# Patient Record
Sex: MALE | Race: WHITE | NOT HISPANIC OR LATINO | Employment: OTHER | URBAN - NONMETROPOLITAN AREA
[De-identification: names, ages, dates, MRNs, and addresses within clinical notes are randomized per-mention and may not be internally consistent; named-entity substitution may affect disease eponyms.]

---

## 2024-11-08 ENCOUNTER — OFFICE VISIT (OUTPATIENT)
Dept: CARDIOLOGY | Facility: CLINIC | Age: 84
End: 2024-11-08
Payer: MEDICARE

## 2024-11-08 VITALS
HEART RATE: 79 BPM | BODY MASS INDEX: 34.5 KG/M2 | SYSTOLIC BLOOD PRESSURE: 102 MMHG | DIASTOLIC BLOOD PRESSURE: 64 MMHG | WEIGHT: 241 LBS | HEIGHT: 70 IN

## 2024-11-08 DIAGNOSIS — I48.20 CHRONIC ATRIAL FIBRILLATION (MULTI): ICD-10-CM

## 2024-11-08 DIAGNOSIS — I25.119 ATHEROSCLEROSIS OF NATIVE CORONARY ARTERY OF NATIVE HEART WITH ANGINA PECTORIS: ICD-10-CM

## 2024-11-08 DIAGNOSIS — I25.10 MULTIPLE VESSEL CORONARY ARTERY DISEASE: ICD-10-CM

## 2024-11-08 DIAGNOSIS — Z78.9 NURSING HOME RESIDENT: ICD-10-CM

## 2024-11-08 DIAGNOSIS — I73.9 PVD (PERIPHERAL VASCULAR DISEASE) (CMS-HCC): ICD-10-CM

## 2024-11-08 DIAGNOSIS — Z79.899 HIGH RISK MEDICATION USE: ICD-10-CM

## 2024-11-08 DIAGNOSIS — R06.00 DYSPNEA, UNSPECIFIED TYPE: Primary | ICD-10-CM

## 2024-11-08 DIAGNOSIS — Z95.1 S/P CABG X 3: ICD-10-CM

## 2024-11-08 DIAGNOSIS — E78.2 MIXED HYPERLIPIDEMIA: ICD-10-CM

## 2024-11-08 DIAGNOSIS — G47.33 OBSTRUCTIVE SLEEP APNEA SYNDROME: ICD-10-CM

## 2024-11-08 DIAGNOSIS — Z87.891 FORMER SMOKER: ICD-10-CM

## 2024-11-08 PROBLEM — E78.5 HYPERLIPIDEMIA: Status: ACTIVE | Noted: 2024-11-08

## 2024-11-08 PROBLEM — E11.65 TYPE 2 DIABETES MELLITUS WITH HYPERGLYCEMIA (MULTI): Status: ACTIVE | Noted: 2024-11-08

## 2024-11-08 PROBLEM — I44.7 LBBB (LEFT BUNDLE BRANCH BLOCK): Status: ACTIVE | Noted: 2024-11-08

## 2024-11-08 PROBLEM — I25.810 ARTERIOSCLEROSIS OF CORONARY ARTERY BYPASS GRAFT: Status: ACTIVE | Noted: 2024-11-08

## 2024-11-08 PROBLEM — I48.19 PERSISTENT ATRIAL FIBRILLATION (MULTI): Status: RESOLVED | Noted: 2024-11-08 | Resolved: 2024-11-08

## 2024-11-08 PROBLEM — G47.30 SLEEP APNEA: Status: ACTIVE | Noted: 2024-11-08

## 2024-11-08 PROBLEM — I48.19 PERSISTENT ATRIAL FIBRILLATION (MULTI): Status: ACTIVE | Noted: 2024-11-08

## 2024-11-08 PROBLEM — I25.810 ARTERIOSCLEROSIS OF CORONARY ARTERY BYPASS GRAFT: Status: RESOLVED | Noted: 2024-11-08 | Resolved: 2024-11-08

## 2024-11-08 PROBLEM — I47.20 VENTRICULAR TACHYCARDIA (MULTI): Status: ACTIVE | Noted: 2024-11-08

## 2024-11-08 PROBLEM — R07.89 OTHER CHEST PAIN: Status: ACTIVE | Noted: 2024-11-08

## 2024-11-08 PROBLEM — R60.0 LOCALIZED EDEMA: Status: ACTIVE | Noted: 2024-11-08

## 2024-11-08 PROCEDURE — 93000 ELECTROCARDIOGRAM COMPLETE: CPT | Performed by: INTERNAL MEDICINE

## 2024-11-08 PROCEDURE — 1159F MED LIST DOCD IN RCRD: CPT | Performed by: INTERNAL MEDICINE

## 2024-11-08 PROCEDURE — 1036F TOBACCO NON-USER: CPT | Performed by: INTERNAL MEDICINE

## 2024-11-08 PROCEDURE — 99204 OFFICE O/P NEW MOD 45 MIN: CPT | Performed by: INTERNAL MEDICINE

## 2024-11-08 RX ORDER — LEVOTHYROXINE SODIUM 25 UG/1
25 TABLET ORAL DAILY
COMMUNITY
Start: 2023-08-08

## 2024-11-08 RX ORDER — ALBUTEROL SULFATE 0.83 MG/ML
2.5 SOLUTION RESPIRATORY (INHALATION)
COMMUNITY
Start: 2023-11-02

## 2024-11-08 RX ORDER — ACETAMINOPHEN 500 MG
5000 TABLET ORAL DAILY
COMMUNITY

## 2024-11-08 RX ORDER — BUMETANIDE 1 MG/1
1 TABLET ORAL DAILY
COMMUNITY
Start: 2023-08-08

## 2024-11-08 RX ORDER — FINASTERIDE 5 MG/1
5 TABLET, FILM COATED ORAL DAILY
COMMUNITY
Start: 2023-11-02

## 2024-11-08 RX ORDER — TAMSULOSIN HYDROCHLORIDE 0.4 MG/1
0.4 CAPSULE ORAL DAILY
COMMUNITY
Start: 2023-11-02

## 2024-11-08 RX ORDER — ATORVASTATIN CALCIUM 40 MG/1
40 TABLET, FILM COATED ORAL DAILY
COMMUNITY
Start: 2023-03-21

## 2024-11-08 RX ORDER — ALLOPURINOL 100 MG/1
100 TABLET ORAL DAILY
COMMUNITY
Start: 2023-09-18

## 2024-11-08 RX ORDER — FLUTICASONE PROPIONATE 50 MCG
1 SPRAY, SUSPENSION (ML) NASAL DAILY
COMMUNITY

## 2024-11-08 RX ORDER — LEVETIRACETAM 750 MG/1
750 TABLET ORAL DAILY
COMMUNITY
Start: 2023-05-30

## 2024-11-08 RX ORDER — ASPIRIN 81 MG/1
81 TABLET ORAL DAILY
COMMUNITY
Start: 2023-05-30

## 2024-11-08 ASSESSMENT — ENCOUNTER SYMPTOMS: SHORTNESS OF BREATH: 1

## 2024-11-08 NOTE — LETTER
November 8, 2024     Herminio Cook DO  [address] [facility]  [place] [address]    Patient: Tino Mccurdy   YOB: 1940   Date of Visit: 11/8/2024       Dear Dr. Herminio Cook, DO:    Thank you for referring Tino Mccurdy to me for evaluation. Below are my notes for this consultation.  If you have questions, please do not hesitate to call me. I look forward to following your patient along with you.       Sincerely,     Crystal Tom MD      CC: No Recipients  ______________________________________________________________________________________    Cardiology Consultation- New Consult    Reason for referral: Shortness of breath and chest pain    HPI: Tino Mccurdy is a 84 y.o. male resident of the [facility].  Patient appears to be a relatively poor historian.  Records reviewed primarily from reviewing the [facility] record.  The patient could not provide detailed information.  Patient report history of coronary artery disease with remote three-vessel bypass surgery.  He does not recall in which hospital it was done.,  History of chronic atrial fibrillation, long-term anticoagulation and chronic shortness of breath.  Patient reports long term history but he quit.  The patient admit very limited exercise tolerance.  He reports mainly shortness of breath.  And nonspecific chest pain without clear precipitating, alleviated or associated symptoms . Chart also suggest peripheral vascular disease and questionable ventricular arrhythmia detail is lacking.  The patient almost wheelchair-bound.  He is not very ambulatory.  He does have +1 edema.  He report he was in the hospital for pneumonia but could not provide detail.    Assessment    1.  Complain of mainly class III shortness of breath and nonspecific intermittent chest pain without clear precipitating, alleviated or associated symptoms  2.  Coronary artery disease with reported three-vessel bypass surgery detail  is lacking  3.  Chronic permanent atrial fibrillation  4.  Long-term anticoagulation  5.  Reported history of peripheral vascular disease details lacking the patient is almost nonambulatory  6.  COPD and prior tobacco use on home oxygen therapy  7.  Mixed hyperlipidemia  8.  Obesity with BMI of 34  9.  ACE allergy detail is lacking    Plan    1.  I will try to retrieve his previous workup from the [facility] including chest x-ray and lab work  2.  In view of difficulty determining functional status, class III shortness of breath in patient with known history of coronary artery disease and have some evidence of volume overload I recommend proceeding with echocardiogram and Lexiscan myocardial perfusion study  3.  We discussed risk factor modification  4.  I reviewed with the patient his current pharmacological therapy which appears to be appropriate  5.  I will see him back in the office after testing is done      Past Medical History:   Past medical history as above    Surgical History:   He has a past surgical history that includes Cataract extraction, bilateral.    Family History:   Family History   Problem Relation Name Age of Onset   • Diabetes Mother         Social History:   Social History     Tobacco Use   • Smoking status: Former     Types: Cigarettes   • Smokeless tobacco: Never   Substance Use Topics   • Alcohol use: Yes     Comment: 1.5 drinks a week        Allergies:  Indomethacin and Lisinopril     Current Medications:    Current Outpatient Medications:   •  albuterol 2.5 mg /3 mL (0.083 %) nebulizer solution, 3 mL (2.5 mg)., Disp: , Rfl:   •  allopurinol (Zyloprim) 100 mg tablet, Take 1 tablet (100 mg) by mouth once daily., Disp: , Rfl:   •  apixaban (Eliquis) 5 mg tablet, Take 1 tablet (5 mg) by mouth 2 times a day., Disp: , Rfl:   •  aspirin 81 mg EC tablet, Take 1 tablet (81 mg) by mouth once daily., Disp: , Rfl:   •  atorvastatin (Lipitor) 40 mg tablet, Take 1 tablet (40 mg) by mouth once  "daily., Disp: , Rfl:   •  bumetanide (Bumex) 1 mg tablet, Take 1 tablet (1 mg) by mouth once daily., Disp: , Rfl:   •  cholecalciferol (Vitamin D3) 5,000 Units tablet, Take 1 tablet (5,000 Units) by mouth once daily., Disp: , Rfl:   •  empagliflozin (Jardiance) 10 mg, Take 1 tablet (10 mg) by mouth once daily., Disp: , Rfl:   •  finasteride (Proscar) 5 mg tablet, Take 1 tablet (5 mg) by mouth once daily., Disp: , Rfl:   •  fluticasone (Flonase) 50 mcg/actuation nasal spray, Administer 1 spray into each nostril once daily. Shake gently. Before first use, prime pump. After use, clean tip and replace cap., Disp: , Rfl:   •  levETIRAcetam (Keppra) 750 mg tablet, Take 1 tablet (750 mg) by mouth once daily., Disp: , Rfl:   •  levothyroxine (Synthroid, Levoxyl) 25 mcg tablet, Take 1 tablet (25 mcg) by mouth early in the morning.., Disp: , Rfl:   •  lubricating eye drops ophthalmic solution, 1 drop every 4 hours if needed for dry eyes., Disp: , Rfl:   •  oxygen (O2) gas therapy, Inhale 1 each continuously. 2.5 lpm, Disp: , Rfl:   •  tamsulosin (Flomax) 0.4 mg 24 hr capsule, Take 1 capsule (0.4 mg) by mouth once daily., Disp: , Rfl:   •  tiotropium-olodateroL (Stiolto Respimat) 2.5-2.5 mcg/actuation mist inhaler, 2 Inhalations once daily., Disp: , Rfl:      Vitals:  Vitals:    11/08/24 0818 11/08/24 0823   BP: 108/56 102/64   BP Location: Left arm Right arm   Patient Position: Sitting Sitting   Pulse: 79    Weight: 109 kg (241 lb)    Height: 1.778 m (5' 10\")       EKG done in office today      Review of Systems   Constitutional: Positive for malaise/fatigue.   Cardiovascular:  Positive for chest pain.   Respiratory:  Positive for shortness of breath.    All other systems reviewed and are negative.      Objective        Physical Exam  Constitutional:       Appearance: Normal appearance.   HENT:      Nose: Nose normal.   Neck:      Vascular: No carotid bruit.   Cardiovascular:      Rate and Rhythm: Normal rate.      Pulses: " Normal pulses.      Heart sounds: Normal heart sounds.   Pulmonary:      Effort: Pulmonary effort is normal.   Abdominal:      General: Bowel sounds are normal.      Palpations: Abdomen is soft.   Musculoskeletal:         General: Normal range of motion.      Cervical back: Normal range of motion.      Right lower le+ Edema present.      Left lower le+ Edema present.   Skin:     General: Skin is warm and dry.   Neurological:      General: No focal deficit present.      Mental Status: He is alert.   Psychiatric:         Mood and Affect: Mood normal.         Behavior: Behavior normal.         Thought Content: Thought content normal.         Judgment: Judgment normal.                Assessment and Plan:   1. Dyspnea, unspecified type  Transthoracic Echo Complete    Nuclear Stress Test      2. Atherosclerosis of native coronary artery of native heart with angina pectoris  Follow Up In Cardiology    ECG 12 Lead    Transthoracic Echo Complete    Nuclear Stress Test      3. Chronic atrial fibrillation (Multi)  ECG 12 Lead      4. High risk medication use        5. PVD (peripheral vascular disease) (CMS-HCC)        6. Mixed hyperlipidemia        7. Multiple vessel coronary artery disease        8. S/P CABG x 3        9. BMI 34.0-34.9,adult        10. Obstructive sleep apnea syndrome        11. Nursing home resident        12. Former smoker               Scribe Attestation  By signing my name below, Kaia HONG LPN, Scribe   attest that this documentation has been prepared under the direction and in the presence of Crystal Tom MD.    Provider Attestation - Scribe documentation    All medical record entries made by the Scribe were at my direction and personally dictated by me. I have reviewed the chart and agree that the record accurately reflects my personal performance of the history, physical exam, discussion and plan.

## 2024-11-08 NOTE — PROGRESS NOTES
Cardiology Consultation- New Consult    Reason for referral: Shortness of breath and chest pain    HPI: Tino Mccurdy is a 84 y.o. male resident of the [facility].  Patient appears to be a relatively poor historian.  Records reviewed primarily from reviewing the [facility] record.  The patient could not provide detailed information.  Patient report history of coronary artery disease with remote three-vessel bypass surgery.  He does not recall in which hospital it was done.,  History of chronic atrial fibrillation, long-term anticoagulation and chronic shortness of breath.  Patient reports long term history but he quit.  The patient admit very limited exercise tolerance.  He reports mainly shortness of breath.  And nonspecific chest pain without clear precipitating, alleviated or associated symptoms . Chart also suggest peripheral vascular disease and questionable ventricular arrhythmia detail is lacking.  The patient almost wheelchair-bound.  He is not very ambulatory.  He does have +1 edema.  He report he was in the hospital for pneumonia but could not provide detail.    Assessment    1.  Complain of mainly class III shortness of breath and nonspecific intermittent chest pain without clear precipitating, alleviated or associated symptoms  2.  Coronary artery disease with reported three-vessel bypass surgery detail is lacking  3.  Chronic permanent atrial fibrillation  4.  Long-term anticoagulation  5.  Reported history of peripheral vascular disease details lacking the patient is almost nonambulatory  6.  COPD and prior tobacco use on home oxygen therapy  7.  Mixed hyperlipidemia  8.  Obesity with BMI of 34  9.  ACE allergy detail is lacking    Plan    1.  I will try to retrieve his previous workup from the [facility] including chest x-ray and lab work  2.  In view of difficulty determining functional status, class III shortness of breath in patient with known history of coronary artery disease  and have some evidence of volume overload I recommend proceeding with echocardiogram and Lexiscan myocardial perfusion study  3.  We discussed risk factor modification  4.  I reviewed with the patient his current pharmacological therapy which appears to be appropriate  5.  I will see him back in the office after testing is done      Past Medical History:   Past medical history as above    Surgical History:   He has a past surgical history that includes Cataract extraction, bilateral.    Family History:   Family History   Problem Relation Name Age of Onset    Diabetes Mother         Social History:   Social History     Tobacco Use    Smoking status: Former     Types: Cigarettes    Smokeless tobacco: Never   Substance Use Topics    Alcohol use: Yes     Comment: 1.5 drinks a week        Allergies:  Indomethacin and Lisinopril     Current Medications:    Current Outpatient Medications:     albuterol 2.5 mg /3 mL (0.083 %) nebulizer solution, 3 mL (2.5 mg)., Disp: , Rfl:     allopurinol (Zyloprim) 100 mg tablet, Take 1 tablet (100 mg) by mouth once daily., Disp: , Rfl:     apixaban (Eliquis) 5 mg tablet, Take 1 tablet (5 mg) by mouth 2 times a day., Disp: , Rfl:     aspirin 81 mg EC tablet, Take 1 tablet (81 mg) by mouth once daily., Disp: , Rfl:     atorvastatin (Lipitor) 40 mg tablet, Take 1 tablet (40 mg) by mouth once daily., Disp: , Rfl:     bumetanide (Bumex) 1 mg tablet, Take 1 tablet (1 mg) by mouth once daily., Disp: , Rfl:     cholecalciferol (Vitamin D3) 5,000 Units tablet, Take 1 tablet (5,000 Units) by mouth once daily., Disp: , Rfl:     empagliflozin (Jardiance) 10 mg, Take 1 tablet (10 mg) by mouth once daily., Disp: , Rfl:     finasteride (Proscar) 5 mg tablet, Take 1 tablet (5 mg) by mouth once daily., Disp: , Rfl:     fluticasone (Flonase) 50 mcg/actuation nasal spray, Administer 1 spray into each nostril once daily. Shake gently. Before first use, prime pump. After use, clean tip and replace cap.,  "Disp: , Rfl:     levETIRAcetam (Keppra) 750 mg tablet, Take 1 tablet (750 mg) by mouth once daily., Disp: , Rfl:     levothyroxine (Synthroid, Levoxyl) 25 mcg tablet, Take 1 tablet (25 mcg) by mouth early in the morning.., Disp: , Rfl:     lubricating eye drops ophthalmic solution, 1 drop every 4 hours if needed for dry eyes., Disp: , Rfl:     oxygen (O2) gas therapy, Inhale 1 each continuously. 2.5 lpm, Disp: , Rfl:     tamsulosin (Flomax) 0.4 mg 24 hr capsule, Take 1 capsule (0.4 mg) by mouth once daily., Disp: , Rfl:     tiotropium-olodateroL (Stiolto Respimat) 2.5-2.5 mcg/actuation mist inhaler, 2 Inhalations once daily., Disp: , Rfl:      Vitals:  Vitals:    24 0818 24 0823   BP: 108/56 102/64   BP Location: Left arm Right arm   Patient Position: Sitting Sitting   Pulse: 79    Weight: 109 kg (241 lb)    Height: 1.778 m (5' 10\")       EKG done in office today      Review of Systems   Constitutional: Positive for malaise/fatigue.   Cardiovascular:  Positive for chest pain.   Respiratory:  Positive for shortness of breath.    All other systems reviewed and are negative.      Objective         Physical Exam  Constitutional:       Appearance: Normal appearance.   HENT:      Nose: Nose normal.   Neck:      Vascular: No carotid bruit.   Cardiovascular:      Rate and Rhythm: Normal rate.      Pulses: Normal pulses.      Heart sounds: Normal heart sounds.   Pulmonary:      Effort: Pulmonary effort is normal.   Abdominal:      General: Bowel sounds are normal.      Palpations: Abdomen is soft.   Musculoskeletal:         General: Normal range of motion.      Cervical back: Normal range of motion.      Right lower le+ Edema present.      Left lower le+ Edema present.   Skin:     General: Skin is warm and dry.   Neurological:      General: No focal deficit present.      Mental Status: He is alert.   Psychiatric:         Mood and Affect: Mood normal.         Behavior: Behavior normal.         Thought " Content: Thought content normal.         Judgment: Judgment normal.                Assessment and Plan:   1. Dyspnea, unspecified type  Transthoracic Echo Complete    Nuclear Stress Test      2. Atherosclerosis of native coronary artery of native heart with angina pectoris  Follow Up In Cardiology    ECG 12 Lead    Transthoracic Echo Complete    Nuclear Stress Test      3. Chronic atrial fibrillation (Multi)  ECG 12 Lead      4. High risk medication use        5. PVD (peripheral vascular disease) (CMS-[facility])        6. Mixed hyperlipidemia        7. Multiple vessel coronary artery disease        8. S/P CABG x 3        9. BMI 34.0-34.9,adult        10. Obstructive sleep apnea syndrome        11. Nursing home resident        12. Former smoker               Scribe Attestation  By signing my name below, Kaia HONG LPN   , [place]   attest that this documentation has been prepared under the direction and in the presence of Crystal Tom MD.    Provider Attestation - Scribe documentation    All medical record entries made by the Scribe were at my direction and personally dictated by me. I have reviewed the chart and agree that the record accurately reflects my personal performance of the history, physical exam, discussion and plan.

## 2024-11-08 NOTE — PATIENT INSTRUCTIONS
Please bring all medicines, vitamins, and herbal supplements with you when you come to the office.    Prescriptions will not be filled unless you are compliant with your follow up appointments or have a follow up appointment scheduled as per instruction of your physician. Refills should be requested at the time of your visit.     Fall Prevention Education Given     BMI was above normal measurement. Current weight: 109 kg (241 lb)  Weight change since last visit (-) denotes wt loss 241 lbs   Weight loss needed to achieve BMI 25: 67.1 Lbs  Weight loss needed to achieve BMI 30: 32.4 Lbs  Provided instructions on dietary changes.    Lexiscan  Echo  Follow up 3 months

## 2024-12-04 ENCOUNTER — HOSPITAL ENCOUNTER (OUTPATIENT)
Dept: RADIOLOGY | Facility: CLINIC | Age: 84
Discharge: HOME | End: 2024-12-04
Payer: MEDICARE

## 2024-12-04 ENCOUNTER — HOSPITAL ENCOUNTER (OUTPATIENT)
Dept: CARDIOLOGY | Facility: CLINIC | Age: 84
Discharge: HOME | End: 2024-12-04
Payer: MEDICARE

## 2024-12-04 VITALS — SYSTOLIC BLOOD PRESSURE: 112 MMHG | HEART RATE: 92 BPM | DIASTOLIC BLOOD PRESSURE: 76 MMHG

## 2024-12-04 DIAGNOSIS — I25.119 ATHEROSCLEROSIS OF NATIVE CORONARY ARTERY OF NATIVE HEART WITH ANGINA PECTORIS: ICD-10-CM

## 2024-12-04 DIAGNOSIS — R06.00 DYSPNEA, UNSPECIFIED TYPE: ICD-10-CM

## 2024-12-04 PROCEDURE — 3430000001 HC RX 343 DIAGNOSTIC RADIOPHARMACEUTICALS: Performed by: INTERNAL MEDICINE

## 2024-12-04 PROCEDURE — 2500000004 HC RX 250 GENERAL PHARMACY W/ HCPCS (ALT 636 FOR OP/ED): Performed by: INTERNAL MEDICINE

## 2024-12-04 PROCEDURE — 78452 HT MUSCLE IMAGE SPECT MULT: CPT

## 2024-12-04 PROCEDURE — A9502 TC99M TETROFOSMIN: HCPCS | Performed by: INTERNAL MEDICINE

## 2024-12-04 PROCEDURE — 93017 CV STRESS TEST TRACING ONLY: CPT

## 2024-12-04 PROCEDURE — 78452 HT MUSCLE IMAGE SPECT MULT: CPT | Performed by: INTERNAL MEDICINE

## 2024-12-04 PROCEDURE — 93018 CV STRESS TEST I&R ONLY: CPT | Performed by: INTERNAL MEDICINE

## 2024-12-04 PROCEDURE — 93016 CV STRESS TEST SUPVJ ONLY: CPT | Performed by: INTERNAL MEDICINE

## 2024-12-04 RX ORDER — AMINOPHYLLINE 25 MG/ML
50 INJECTION, SOLUTION INTRAVENOUS ONCE
Status: COMPLETED | OUTPATIENT
Start: 2024-12-04 | End: 2024-12-04

## 2024-12-04 RX ORDER — REGADENOSON 0.08 MG/ML
0.4 INJECTION, SOLUTION INTRAVENOUS ONCE
Status: COMPLETED | OUTPATIENT
Start: 2024-12-04 | End: 2024-12-04

## 2024-12-06 ENCOUNTER — TELEPHONE (OUTPATIENT)
Dept: CARDIOLOGY | Facility: CLINIC | Age: 84
End: 2024-12-06
Payer: MEDICARE

## 2024-12-06 NOTE — TELEPHONE ENCOUNTER
----- Message from Crystal Tom sent at 12/5/2024  4:39 PM EST -----  Advise heart function is weak.  No ischemia I am awaiting his echo to review.  No new order for now will review next OV  ----- Message -----  From: Interface, Radiology Results In  Sent: 12/4/2024   4:49 PM EST  To: Crystal Tom MD

## 2024-12-11 ENCOUNTER — HOSPITAL ENCOUNTER (OUTPATIENT)
Dept: CARDIOLOGY | Facility: CLINIC | Age: 84
Discharge: HOME | End: 2024-12-11
Payer: MEDICARE

## 2024-12-11 DIAGNOSIS — R06.00 DYSPNEA, UNSPECIFIED TYPE: ICD-10-CM

## 2024-12-11 DIAGNOSIS — I25.119 ATHEROSCLEROSIS OF NATIVE CORONARY ARTERY OF NATIVE HEART WITH ANGINA PECTORIS: ICD-10-CM

## 2024-12-11 DIAGNOSIS — I25.10 ATHEROSCLEROTIC HEART DISEASE OF NATIVE CORONARY ARTERY WITHOUT ANGINA PECTORIS: ICD-10-CM

## 2024-12-11 LAB
AORTIC VALVE MEAN GRADIENT: 5 MMHG
AORTIC VALVE PEAK VELOCITY: 1.46 M/S
AV PEAK GRADIENT: 9 MMHG
AVA (PEAK VEL): 1.48 CM2
AVA (VTI): 1.25 CM2
EJECTION FRACTION APICAL 4 CHAMBER: 27.7
EJECTION FRACTION: 25 %
LEFT VENTRICLE INTERNAL DIMENSION DIASTOLE: 6.09 CM (ref 3.5–6)
LEFT VENTRICULAR OUTFLOW TRACT DIAMETER: 2.6 CM
LV EJECTION FRACTION BIPLANE: 28 %
MITRAL VALVE E/A RATIO: 2.21
MITRAL VALVE E/E' RATIO: 9.3
RIGHT VENTRICLE PEAK SYSTOLIC PRESSURE: 44.5 MMHG

## 2024-12-11 PROCEDURE — 93306 TTE W/DOPPLER COMPLETE: CPT

## 2024-12-11 PROCEDURE — 93306 TTE W/DOPPLER COMPLETE: CPT | Performed by: INTERNAL MEDICINE

## 2024-12-12 ENCOUNTER — TELEPHONE (OUTPATIENT)
Dept: CARDIOLOGY | Facility: CLINIC | Age: 84
End: 2024-12-12
Payer: MEDICARE

## 2024-12-12 NOTE — TELEPHONE ENCOUNTER
Result Communication    Resulted Orders   Transthoracic Echo Complete   Result Value Ref Range    AV mn grad 5 mmHg    AV pk pamela 1.46 m/s    LV Biplane EF 28 %    LVOT diam 2.60 cm    MV E/A ratio 2.21     MV avg E/e' ratio 9.30     LV EF 25 %    RVSP 44.5 mmHg    LVIDd 6.09 cm    Aortic Valve Area by Continuity of Peak Velocity 1.48 cm2    AV pk grad 9 mmHg    Aortic Valve Area by Continuity of VTI 1.25 cm2    LV A4C EF 27.7     Narrative                   [address], [address] [address]          Tel [phone] Fax [phone]    TRANSTHORACIC ECHOCARDIOGRAM REPORT    Patient Name:       MACI Lara Physician:    Orlando Tom MD  Study Date:         12/11/2024          Ordering Provider:    Orlando TOM  MRN/PID:            [id number]            Fellow:  Accession#:         [id number]        Nurse:  Date of Birth/Age:  1940 / 84 years Sonographer:          Lyudmila Rayo                                                                RDCS, RDMS, RVT  Gender Assigned at  M                   Additional Staff:  Birth:  Height:             177.80 cm           Admit Date:  Weight:             109.32 kg           Admission Status:     Outpatient  BSA / BMI:          2.26 m2 / 34.58     Department Location:  [facility]                      kg/m2                                     [place]  Blood Pressure: 110 /64 mmHg    Study Type:    TRANSTHORACIC ECHO (TTE) COMPLETE  Diagnosis/ICD: Atherosclerotic heart disease of native coronary artery without                 angina pectoris-I25.10  Indication:    CAD, SOB, Afib, COPD, Edema, CABG, LBBB  CPT Codes:     Echo Complete w Full [id number]   Study Detail: The following Echo studies were performed: 2D, M-Mode, Doppler and                color  flow.       PHYSICIAN INTERPRETATION:  Left Ventricle: The left ventricular systolic function is severely decreased, with a visually estimated ejection fraction of 25%. There is severe eccentric left ventricular hypertrophy. There are no regional wall motion abnormalities. The left ventricular cavity size is normal. There is mildly increased posterior left ventricular wall thickness. Left ventricular diastolic filling cannot be determined, due to atrial fibrillation/flutter. Mild LVH.  Left Atrium: The left atrium is moderately dilated.  Right Ventricle: The right ventricle is normal in size. There is moderately reduced right ventricular systolic function.  Right Atrium: The right atrium is moderately dilated.  Aortic Valve: The aortic valve is trileaflet. The aortic valve dimensionless index is 0.24. There is mild aortic valve regurgitation. The peak instantaneous gradient of the aortic valve is 9 mmHg. The mean gradient of the aortic valve is 5 mmHg.  Mitral Valve: The mitral valve is normal in structure. There is mild to moderate mitral valve regurgitation. The mitral regurgitant orifice area is 38 mm2.  Tricuspid Valve: The tricuspid valve is structurally normal. There is mild tricuspid regurgitation. The Doppler estimated RVSP is moderately elevated right ventricular systolic pressure at 44.5 mmHg.  Pulmonic Valve: The pulmonic valve is structurally normal. There is trace to mild pulmonic valve regurgitation.  Pericardium: No pericardial effusion noted.  Aorta: The aortic root is normal.       CONCLUSIONS:   1. The left ventricular systolic function is severely decreased, with a visually estimated ejection fraction of 25%.   2. Left ventricular diastolic filling cannot be determined, due to atrial fibrillation/flutter.   3. There is moderately reduced right ventricular systolic function.   4. The left atrium is moderately dilated.   5. The right atrium is moderately dilated.   6. Mild to moderate mitral valve  regurgitation.   7. Moderately elevated right ventricular systolic pressure.   8. Mild aortic valve regurgitation.   9. No previous study available for comparison.    QUANTITATIVE DATA SUMMARY:     2D MEASUREMENTS:            Normal Ranges:  Ao Root d:       3.70 cm    (2.0-3.7cm)  LAs:             5.90 cm    (2.7-4.0cm)  RVIDd:           3.98 cm    (0.9-3.6cm)  IVSd:            1.64 cm    (0.6-1.1cm)  LVPWd:           1.19 cm    (0.6-1.1cm)  LVIDd:           6.09 cm    (3.9-5.9cm)  LVIDs:           5.10 cm  LV Mass Index:   178.4 g/m2  LV % FS          16.3 %       AORTA MEASUREMENTS:         Normal Ranges:  Asc Ao, d:          3.40 cm (2.1-3.4cm)       LV SYSTOLIC FUNCTION BY 2D PLANIMETRY (MOD):                       Normal Ranges:  EF-A4C View:    28 % (>=55%)  EF-A2C View:    27 %  EF-Biplane:     28 %  EF-Visual:      25 %  LV EF Reported: 25 %       LV DIASTOLIC FUNCTION:           Normal Ranges:  MV Peak E:             0.95 m/s  (0.7-1.2 m/s)  MV Peak A:             0.43 m/s  (0.42-0.7 m/s)  E/A Ratio:             2.21      (1.0-2.2)  MV e'                  0.070 m/s (>8.0)  MV lateral e'          0.10 m/s  MV medial e'           0.04 m/s  E/e' Ratio:            13.54     (<8.0)       MITRAL INSUFFICIENCY:             Normal Ranges:  PISA Radius:          0.9 cm  MR Vmax:              462.00 cm/s  MR Alias Julián:         34.1 cm/s  MR Flow Rt:           173.55 ml/s  MR EROA:              38 mm2  dP/dt:                449 mmHg/s  (>1200mmHg/sec)       AORTIC VALVE:                     Normal Ranges:  AoV Vmax:                1.46 m/s (<=1.7m/s)  AoV Peak P.5 mmHg (<20mmHg)  AoV Mean P.0 mmHg (1.7-11.5mmHg)  LVOT Max Julián:            0.41 m/s (<=1.1m/s)  AoV VTI:                 25.60 cm (18-25cm)  LVOT VTI:                6.04 cm  LVOT Diameter:           2.60 cm  (1.8-2.4cm)  AoV Area, VTI:           1.25 cm2 (2.5-5.5cm2)  AoV Area,Vmax:           1.48 cm2 (2.5-4.5cm2)  AoV  Dimensionless Index: 0.24       TRICUSPID VALVE/RVSP:          Normal Ranges:  Peak TR Velocity:     3.22 m/s  RV Syst Pressure:     44 mmHg  (< 30mmHg)       PULMONIC VALVE:          Normal Ranges:  PV Max Julián:     0.8 m/s  (0.6-0.9m/s)  PV Max P.5 mmHg       [id number] Crystal Tom MD  Electronically signed on 2024 at 4:47:03 PM         ** Final **         1:42 PM    Phoned the [facility] where patient resides, Spoke with nurse Ilana advised of test result, verbalized understanding. Advised we will discuss more in depth at POV 2025

## 2024-12-12 NOTE — TELEPHONE ENCOUNTER
----- Message from Crystal Tom sent at 12/12/2024 12:54 PM EST -----  Advise echo showed severe heart muscle weakness will discuss during next OV  ----- Message -----  From: Rohan Syngo - Cardiology Results In  Sent: 12/11/2024   4:47 PM EST  To: Crystal Tom MD

## 2025-03-07 ENCOUNTER — APPOINTMENT (OUTPATIENT)
Dept: CARDIOLOGY | Facility: CLINIC | Age: 85
End: 2025-03-07
Payer: MEDICARE